# Patient Record
Sex: FEMALE | Race: BLACK OR AFRICAN AMERICAN | Employment: FULL TIME | URBAN - METROPOLITAN AREA
[De-identification: names, ages, dates, MRNs, and addresses within clinical notes are randomized per-mention and may not be internally consistent; named-entity substitution may affect disease eponyms.]

---

## 2022-05-18 ENCOUNTER — APPOINTMENT (OUTPATIENT)
Dept: CT IMAGING | Age: 72
End: 2022-05-18
Payer: COMMERCIAL

## 2022-05-18 ENCOUNTER — HOSPITAL ENCOUNTER (EMERGENCY)
Age: 72
Discharge: HOME | End: 2022-05-18
Attending: EMERGENCY MEDICINE
Payer: COMMERCIAL

## 2022-05-18 VITALS
HEIGHT: 65 IN | OXYGEN SATURATION: 100 % | HEART RATE: 88 BPM | TEMPERATURE: 97.3 F | SYSTOLIC BLOOD PRESSURE: 169 MMHG | DIASTOLIC BLOOD PRESSURE: 88 MMHG | RESPIRATION RATE: 16 BRPM

## 2022-05-18 DIAGNOSIS — S09.90XA INJURY OF HEAD, INITIAL ENCOUNTER: Primary | ICD-10-CM

## 2022-05-18 DIAGNOSIS — R20.2 TINGLING IN EXTREMITIES: ICD-10-CM

## 2022-05-18 PROCEDURE — 6360000002 HC RX W HCPCS: Performed by: EMERGENCY MEDICINE

## 2022-05-18 PROCEDURE — 90471 IMMUNIZATION ADMIN: CPT | Performed by: EMERGENCY MEDICINE

## 2022-05-18 PROCEDURE — 72125 CT NECK SPINE W/O DYE: CPT

## 2022-05-18 PROCEDURE — 99284 EMERGENCY DEPT VISIT MOD MDM: CPT

## 2022-05-18 PROCEDURE — 70450 CT HEAD/BRAIN W/O DYE: CPT

## 2022-05-18 PROCEDURE — 90715 TDAP VACCINE 7 YRS/> IM: CPT | Performed by: EMERGENCY MEDICINE

## 2022-05-18 RX ADMIN — TETANUS TOXOID, REDUCED DIPHTHERIA TOXOID AND ACELLULAR PERTUSSIS VACCINE, ADSORBED 0.5 ML: 5; 2.5; 8; 8; 2.5 SUSPENSION INTRAMUSCULAR at 09:14

## 2022-05-18 ASSESSMENT — PAIN - FUNCTIONAL ASSESSMENT
PAIN_FUNCTIONAL_ASSESSMENT: NONE - DENIES PAIN
PAIN_FUNCTIONAL_ASSESSMENT: NONE - DENIES PAIN

## 2022-05-18 NOTE — ED PROVIDER NOTES
CHIEF COMPLAINT  Fall (30 min pta, was carrying 2 bags while rushing and 1 bag got in between pt's legs and fell. no loc. denies head or neck pain. +abrasion left forehead. c/o tingling both arms)      HISTORY OF PRESENT ILLNESS  Lois Souza is a 71 y.o. female with no significant history presenting for evaluation of fall. Patient states about 30 minutes ago, she was walking into work carrying 2 grocery bags when she tripped because one of the grocery bags got in between her legs and she fell. She states that she is a generally clumsy person and has had multiple falls. She states that she fell forward striking the left side of her forehead. No loss of consciousness she does have an abrasion over the forehead. She is not up-to-date on tetanus vaccination. She states immediately after the fall, she was not able to get up and she states that she regained function of the strength of her arms and legs shortly after. She states that she currently has bilateral upper arm tingling. She states that this is improving while she is talking to me. No use of anticoagulation. No bowel or bladder incontinence. No weakness or numbness in the upper or lower extremities. No neck pain or head pain. No nausea or vomiting. History reviewed. No pertinent past medical history. Past Surgical History:   Procedure Laterality Date    HYSTERECTOMY       History reviewed. No pertinent family history.   Social History     Socioeconomic History    Marital status:      Spouse name: Not on file    Number of children: Not on file    Years of education: Not on file    Highest education level: Not on file   Occupational History    Not on file   Tobacco Use    Smoking status: Never Smoker    Smokeless tobacco: Never Used   Substance and Sexual Activity    Alcohol use: Yes     Comment: occas    Drug use: Never    Sexual activity: Not on file   Other Topics Concern    Not on file   Social History Narrative    Not on file     Social Determinants of Health     Financial Resource Strain:     Difficulty of Paying Living Expenses: Not on file   Food Insecurity:     Worried About Running Out of Food in the Last Year: Not on file    Mj of Food in the Last Year: Not on file   Transportation Needs:     Lack of Transportation (Medical): Not on file    Lack of Transportation (Non-Medical): Not on file   Physical Activity:     Days of Exercise per Week: Not on file    Minutes of Exercise per Session: Not on file   Stress:     Feeling of Stress : Not on file   Social Connections:     Frequency of Communication with Friends and Family: Not on file    Frequency of Social Gatherings with Friends and Family: Not on file    Attends Mosque Services: Not on file    Active Member of [address] [place] [address] [employer] or Organizations: Not on file    Attends Club or Organization Meetings: Not on file    Marital Status: Not on file   Intimate Partner Violence:     Fear of Current or Ex-Partner: Not on file    Emotionally Abused: Not on file    Physically Abused: Not on file    Sexually Abused: Not on file   Housing Stability:     Unable to Pay for Housing in the Last Year: Not on file    Number of [place] in the Last Year: Not on file    Unstable Housing in the Last Year: Not on file     No current facility-administered medications for this encounter. No current outpatient medications on file. No Known Allergies    REVIEW OF SYSTEMS  Positive and pertinent negatives as per HPI. All other systems were reviewed and are negative. PHYSICAL EXAM  BP (!) 169/88   Pulse 88   Temp 97.3 °F (36.3 °C) (Oral)   Resp 16   Ht 5' 5\" (1.651 m)   SpO2 100%   GENERAL APPEARANCE: Awake and alert. Cooperative. HEAD: Normocephalic. Deep abrasion to the left forehead, slight oozing no arterial bleeding. There is no midline C-spine tenderness  EYES: PERRL. EOM's grossly intact. No signs of ocular entrapment.   ENT: Mucous membranes are moist. HEART: RRR. No harsh murmurs. Intact radial pulses 2+ bilaterally. LUNGS: Respirations unlabored without accessory muscle use. Speaking comfortably in full sentences. ABDOMEN: Soft. Non-distended. Non-tender. No guarding or rebound. EXTREMITIES: No peripheral edema. No acute deformities. SKIN: Warm and dry. No acute rashes. NEUROLOGICAL: Alert and oriented X 3. CN II-XII intact. No gross facial drooping. Strength 5/5, sensation intact. No pronator drift. Normal coordination. Gait normal.   t. LABS  I have reviewed all labs for this visit. No results found for this visit on 05/18/22. RADIOLOGY  X-RAYS:  I have reviewed radiologic plain film image(s). ALL OTHER NON-PLAIN FILM IMAGES SUCH AS CT, ULTRASOUND AND MRI HAVE BEEN READ BY THE RADIOLOGIST. 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 MRI of the cervical spine ON be better for evaluation. Enlarged thyroid with multiple calcified nodules. Ultrasound is recommended. No results found. ED COURSE/MDM  Patient seen and evaluated. Old records reviewed. Labs and imaging reviewed and results discussed with patient. 81-year-old female with mechanical fall, head injury, for a brief period of time she reported that she did not have function of strength in her upper and lower extremities however has no weakness currently and she states that she just has some residual tingling of the bilateral upper extremities which is also resolving. Patient has bleeding abrasion to the left forehead no midline C-spine tenderness no additional neurological deficits. ED evaluation clued CT head, C-spine to screen for traumatic abnormality.   The patient's mechanism and symptoms are concerning for potential spinal cord injury, central cord syndrome however her symptoms seem to be quickly resolving. If her symptoms are persistent, I will recommend the patient to be admitted for MRI imaging. We will place Surgicel on the bleeding abrasion to the forehead as this is not amenable to suturing. She will be updated on tetanus vaccination. CT head shows chronic age-related changes no acute process. CT C-spine without traumatic injury. Patient reports that she has no numbness or tingling in her extremities whatsoever on reevaluation. As she has no current neurological symptoms, patient is agreeable for discharge home with PCP follow-up. She was advised that if she did develop new numbness, tingling, weakness to come back to emergency department for MRI imaging and additional evaluation. She was advised on wound care for her forehead abrasion. The patient will be discharged from the emergency department. The patient was counseled on their diagnosis and any medications prescribed. They were advised on the need for PCP followup. They were counseled on the need to return to the emergency department if any of their symptoms were to worsen, change or have any other concerns. Discharged in stable condition. CLINICAL IMPRESSION  1. Injury of head, initial encounter    2. Tingling in extremities        Blood pressure (!) 169/88, pulse 88, temperature 97.3 °F (36.3 °C), temperature source Oral, resp. rate 16, height 5' 5\" (1.651 m), SpO2 100 %. DISPOSITION  [email or web address] Mullins was discharged to home in stable condition. This chart was generated in part by using Dragon Dictation system and may contain errors related to that system including errors in grammar, punctuation, and spelling, as well as words and phrases that may be inappropriate. If there are any questions or concerns please feel free to contact the dictating provider for clarification.      Arnold Kennedy MD  05/18/22 [id number]

## 2023-01-27 ENCOUNTER — TELEPHONE (OUTPATIENT)
Dept: INTERNAL MEDICINE CLINIC | Age: 73
End: 2023-01-27

## 2023-01-27 NOTE — TELEPHONE ENCOUNTER
----- Message from Chelsea Bass sent at 1/10/2023  1:57 PM EST -----  Subject: Appointment Request    Reason for Call: Established Patient Appointment needed: New Patient   Request Appointment    QUESTIONS    Reason for appointment request? Requested Provider unavailable - Sonali Mccollum     Additional Information for Provider? Patient would like to establish new   care with Dr. Israel Coronel. Her sister Krissy Gil is a current patient of   his. I couldn't find any availability. Contact number is for her sister   Donnie Severe.  You can leave a vm but the mailbox might be full.  ---------------------------------------------------------------------------  --------------  CALL BACK INFO  [phone]; OK to leave message on voicemail  ---------------------------------------------------------------------------  --------------  SCRIPT ANSWERS  COVID Screen: Vicente Warren

## 2023-01-27 NOTE — TELEPHONE ENCOUNTER
Called patient to inform her that physician is not excepting new patient's.  Was informed that she already had a physician that she is seeing at [address] [place] [address]